# Patient Record
(demographics unavailable — no encounter records)

---

## 2025-05-30 NOTE — PHYSICAL EXAM
[FreeTextEntry3] :  Ill-defined erythematous scaly rough patches located on trunk and extremities diffusely as well as focally on face

## 2025-05-30 NOTE — HISTORY OF PRESENT ILLNESS
[FreeTextEntry1] : NPA- Rash diffuse [de-identified] : LUISA MERCADO is a 2-year boy who presents for rash diffuse. The patient is here with mother. Location diffused through whole body, mostly on face. Onset/Duration patient has had the rash for 3-4 weeks. Symptoms patient has itchiness around the areas. Treatments mother has been applying hydrocortisone 2.5% cream given by pcp, patients mother is applying Aquaphor and Aveeno lotion as well  Family history Mother has history of eczema.

## 2025-05-30 NOTE — ASSESSMENT
[Use of independent historian: [ enter independent historian's relationship to patient ] :____] : As the patient was unable to provide a complete and reliable history, I obtained clinical history from the patient's [unfilled] [FreeTextEntry1] :  #Atopic dermatitis: We discussed that atopic dermatitis is a chronic condition that flares from time to time. Some kids outgrow their eczema while others do not. It is important to continue sensitive skin care between flares and use medications as needed. Eczema can lead to temporary light or dark spots that improve when the eczema resolves. All caretakers should avoid perfumes and use free and clear laundry detergents. Avoid use of air fresheners. -Start triamcinolone 0.1% ointment once daily to rough red scaly areas on body for two weeks on, one week off, repeat as needed for flare ups -May use HCT 2.5% cream PRN flare ups on the face -Reviewed sensitive skin care products and discussed recommended changes --Take short warm showers once daily (avoid hot water; it removes natural oils). For babies, bathe with cleanser no more than 2-3 times per week. --Use a mild cleanser --Apply moisturizing ointment or cream to entire body after showering or bathing within 3 minutes --Reapply moisturizer throughout the day as needed --Use laundry detergent that is free of dyes and perfumes --Preferred skin care products reviewed   FU 2-3 months